# Patient Record
Sex: MALE | Race: WHITE | NOT HISPANIC OR LATINO | URBAN - METROPOLITAN AREA
[De-identification: names, ages, dates, MRNs, and addresses within clinical notes are randomized per-mention and may not be internally consistent; named-entity substitution may affect disease eponyms.]

---

## 2019-02-22 ENCOUNTER — EMERGENCY (EMERGENCY)
Facility: HOSPITAL | Age: 39
LOS: 1 days | Discharge: HOME | End: 2019-02-22
Admitting: EMERGENCY MEDICINE
Payer: SELF-PAY

## 2019-02-22 VITALS
OXYGEN SATURATION: 100 % | TEMPERATURE: 98 F | DIASTOLIC BLOOD PRESSURE: 84 MMHG | HEART RATE: 80 BPM | RESPIRATION RATE: 18 BRPM | SYSTOLIC BLOOD PRESSURE: 127 MMHG

## 2019-02-22 PROCEDURE — 99284 EMERGENCY DEPT VISIT MOD MDM: CPT | Mod: 25

## 2019-02-22 PROCEDURE — 73030 X-RAY EXAM OF SHOULDER: CPT | Mod: 26,LT

## 2019-02-22 PROCEDURE — 99053 MED SERV 10PM-8AM 24 HR FAC: CPT

## 2019-02-22 PROCEDURE — 23600 CLTX PROX HUMRL FX W/O MNPJ: CPT | Mod: 54

## 2019-02-22 PROCEDURE — 73060 X-RAY EXAM OF HUMERUS: CPT | Mod: 26,LT

## 2019-02-22 NOTE — ED PROVIDER NOTE - CARE PROVIDER_API CALL
Getachew Kessler)  Orthopaedic Surgery  [address] [place] [address]  Phone: [phone]  Fax: [phone]  Follow Up Time:

## 2019-02-22 NOTE — ED PROVIDER NOTE - CARE PLAN
Principal Discharge DX:	Humeral head fracture, left, closed, initial encounter  Secondary Diagnosis:	Hill Sachs deformity, left

## 2019-02-22 NOTE — ED PROVIDER NOTE - DIAGNOSTIC INTERPRETATION
Xray (wet reads) interpreted by ROSAMARIA GIFFORD   Xray shoulder - +communiated humeral head fx with intra-articular extension and reversed hill sach, slightly widened AC joint, no candace dislocation, joint space intact, no effusion noted. No foreign body noted

## 2019-02-22 NOTE — ED ADULT NURSE NOTE - NSIMPLEMENTINTERV_GEN_ALL_ED
Implemented All Fall Risk Interventions:  [place] to call system. Call bell, personal items and telephone within reach. Instruct patient to call for assistance. Room bathroom lighting operational. Non-slip footwear when patient is off stretcher. Physically safe environment: no spills, clutter or unnecessary equipment. Stretcher in lowest position, wheels locked, appropriate side rails in place. Provide visual cue, wrist band, yellow gown, etc. Monitor gait and stability. Monitor for mental status changes and reorient to person, place, and time. Review medications for side effects contributing to fall risk. Reinforce activity limits and safety measures with patient and family.

## 2019-02-22 NOTE — ED PROVIDER NOTE - OBJECTIVE STATEMENT
37 yo M with no known PMHx, RHD, presenting c/o L shoulder and arm pain s/p fall 39 yo M with no known PMHx, RHD, presenting c/o L shoulder and arm pain s/p fall.  Pt tripped and fell and landed on his L arm region.  Noted pain with restricted ROM subsequently. Denies prodromes, head trauma, LOC, bleeding, HA, dizziness, SOB, CP, palpitations, N/V, change in ROM/sensation, abdominal/back/neck pain, focal weakness, change in vision/mental status/speech, paresthesia, and malaise. Pt is ambulatory s/p fall.

## 2019-02-22 NOTE — ED PROVIDER NOTE - CLINICAL SUMMARY MEDICAL DECISION MAKING FREE TEXT BOX
pt with traumatic injury to the L shoulder, no other associated sx or prodromes, xray with proximal humeral head fx, +reversed hill sach lesion, suggestive of posterior dislocation which self induced, ortho consulted, seen by Dr. Kessler, will tx with sling and prompt f/u in the office, strict return precautions discussed, pt verbalized understanding

## 2019-02-22 NOTE — ED PROVIDER NOTE - PHYSICAL EXAMINATION
Vital Signs - nursing notes reviewed and confirmed  Gen - WDWN M, NAD, comfortable and non-toxic appearing, speaking in full sentences   Skin - warm, dry, intact  HEENT - AT/NC, PERRL, EOMI, no conjunctival injection, moist oral mucosa, TM intact b/l with good cone of lights, o/p clear with no erythema, edema, or exudate, uvula midline, airway patent, neck supple and NT, FROM  CV - S1S2, R/R/R  Resp - respiration non-labored, CTAB, symmetric bs b/l, no r/r/w  GI - NABS, soft, ND, NT, no rebound or guarding, no CVAT b/l   MS - w/w/p, , calves supple and NT, distal pulses symmetric b/l, brisk cap refills, +SILT  Neuro - AxOx3, no focal neuro deficits, CN II-XII grossly intact, cerebellar function intact, negative pronator drift, negative nystagmus, ambulatory without gait disturbance

## 2019-02-22 NOTE — ED ADULT NURSE NOTE - OBJECTIVE STATEMENT
pt is a 39 y/o M who comes into the ed for fall and shoulder injury. pt reports he was running when he tripped and fell onto his left elbow and shoulder. denies daily meds, PMH. pt unable to lift shoulder. full rom distally to shoulder. denies numbness or tingling. pt upgraded for pain upon arrival.

## 2019-02-23 PROCEDURE — 73200 CT UPPER EXTREMITY W/O DYE: CPT | Mod: 26,LT

## 2019-02-23 PROCEDURE — 73060 X-RAY EXAM OF HUMERUS: CPT | Mod: 26,LT

## 2019-02-23 PROCEDURE — 73030 X-RAY EXAM OF SHOULDER: CPT | Mod: 26,LT

## 2019-02-23 RX ORDER — OXYCODONE AND ACETAMINOPHEN 5; 325 MG/1; MG/1
1 TABLET ORAL ONCE
Qty: 0 | Refills: 0 | Status: DISCONTINUED | OUTPATIENT
Start: 2019-02-23 | End: 2019-02-23

## 2019-02-23 RX ORDER — IBUPROFEN 200 MG
1 TABLET ORAL
Qty: 20 | Refills: 0 | OUTPATIENT
Start: 2019-02-23 | End: 2019-03-24

## 2019-02-23 RX ADMIN — OXYCODONE AND ACETAMINOPHEN 1 TABLET(S): 5; 325 TABLET ORAL at 01:50

## 2019-02-23 RX ADMIN — OXYCODONE AND ACETAMINOPHEN 1 TABLET(S): 5; 325 TABLET ORAL at 02:33

## 2019-02-23 NOTE — ED PROCEDURE NOTE - GENERAL PROCEDURE DETAILS
Site appropriately prepped with iodine soultion.  10cc plain 1% lidocaine injected into intra-articular space of the AC joint under aseptic techniques.  no active bleeding s/p intra-articular block and pt tolerated procedure well without complications.
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
## 2019-02-26 DIAGNOSIS — S42.292A OTHER DISPLACED FRACTURE OF UPPER END OF LEFT HUMERUS, INITIAL ENCOUNTER FOR CLOSED FRACTURE: ICD-10-CM

## 2019-02-26 DIAGNOSIS — M25.512 PAIN IN LEFT SHOULDER: ICD-10-CM
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
## 2019-08-05 ENCOUNTER — EMERGENCY (EMERGENCY)
Facility: HOSPITAL | Age: 39
LOS: 1 days | Discharge: HOME | End: 2019-08-05
Attending: EMERGENCY MEDICINE | Admitting: EMERGENCY MEDICINE
Payer: SELF-PAY

## 2019-08-05 VITALS
TEMPERATURE: 98 F | SYSTOLIC BLOOD PRESSURE: 137 MMHG | HEART RATE: 104 BPM | OXYGEN SATURATION: 98 % | RESPIRATION RATE: 17 BRPM | DIASTOLIC BLOOD PRESSURE: 94 MMHG

## 2019-08-05 PROCEDURE — 28470 CLTX METATARSAL FX WO MNP EA: CPT | Mod: 54,LT

## 2019-08-05 PROCEDURE — 99284 EMERGENCY DEPT VISIT MOD MDM: CPT | Mod: 25

## 2019-08-05 PROCEDURE — 73630 X-RAY EXAM OF FOOT: CPT | Mod: 26,LT

## 2019-08-05 RX ORDER — KETOROLAC TROMETHAMINE 30 MG/ML
30 SYRINGE (ML) INJECTION ONCE
Refills: 0 | Status: DISCONTINUED | OUTPATIENT
Start: 2019-08-05 | End: 2019-08-05

## 2019-08-05 RX ADMIN — Medication 30 MILLIGRAM(S): at 20:32

## 2019-08-05 RX ADMIN — Medication 30 MILLIGRAM(S): at 20:34

## 2019-08-05 NOTE — ED ADULT NURSE REASSESSMENT NOTE - NS ED NURSE REASSESS COMMENT FT1
Patient remains in exam chair accomp by family member. Medicated as per provider order and tolerated well. Ice provided for effected extremity. Currently awaiting imagine results and further disposition.

## 2019-08-05 NOTE — ED PROVIDER NOTE - CARE PLAN
Principal Discharge DX:	Closed displaced fracture of fifth metatarsal bone of left foot, initial encounter

## 2019-08-05 NOTE — ED PROVIDER NOTE - CARE PROVIDER_API CALL
Getachew Kessler)  Orthopaedic Surgery  [address] [place] [address]  Phone: [phone]  Fax: [phone]  Follow Up Time:

## 2019-08-05 NOTE — ED PROVIDER NOTE - MUSCULOSKELETAL, MLM
TTP with edema and ecchymosis over base of 5th metatarsal. Sensation intact. Motor intact. Unable to bear weight secondary to pain. <2 secs capillary refill. 2+ DP pulse.

## 2019-08-05 NOTE — ED ADULT TRIAGE NOTE - CHIEF COMPLAINT QUOTE
pt tripped down some [place] stairs and braced his fall but turned foot and heard something pop on lateral side of left foot.

## 2019-08-05 NOTE — ED PROVIDER NOTE - DIAGNOSTIC INTERPRETATION
Left foot x-ray, Interpreted by Dr. Xiong  base of 5th metatarsal fracture minimally displaced comminuted fracture.

## 2019-08-05 NOTE — ED PROVIDER NOTE - OBJECTIVE STATEMENT
40 y/o Male with no PMHx, presents to the ED c/o left foot pain. [place] rolled his foot outward and heard a "pop" sound. Denies fever, chills, visual changes, CP, SOB, abd pain, N/V, and neck/back pain.No PSHx. Smoker and drinks EtOH, recently drank today.

## 2019-08-05 NOTE — ED PROVIDER NOTE - CLINICAL SUMMARY MEDICAL DECISION MAKING FREE TEXT BOX
40 y/o Male with Left foot pain after he tripped down the stair and everted his foot with a popping sensation. Evidence of base of 5th metatarsal minimally displaced comminuted fracture. Plan to provide splint. None weight bearing and f/u with orthopedics.
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
## 2019-08-10 DIAGNOSIS — M79.672 PAIN IN LEFT FOOT: ICD-10-CM
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
## 2019-08-10 DIAGNOSIS — S92.352A DISPLACED FRACTURE OF FIFTH METATARSAL BONE, LEFT FOOT, INITIAL ENCOUNTER FOR CLOSED FRACTURE: ICD-10-CM
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
## 2021-05-04 NOTE — ED PROVIDER NOTE - NS HIV RISK FACTOR YES
